# Patient Record
Sex: MALE | URBAN - METROPOLITAN AREA
[De-identification: names, ages, dates, MRNs, and addresses within clinical notes are randomized per-mention and may not be internally consistent; named-entity substitution may affect disease eponyms.]

---

## 2018-04-16 ENCOUNTER — HOSPITAL ENCOUNTER (OUTPATIENT)
Age: 23
Setting detail: SPECIMEN
Discharge: HOME | End: 2018-04-16
Payer: COMMERCIAL

## 2018-04-19 LAB — SURGICAL PATHOLOGY REPORT: NORMAL

## 2023-12-01 ENCOUNTER — OFFICE VISIT (OUTPATIENT)
Age: 28
End: 2023-12-01

## 2023-12-01 ENCOUNTER — HOSPITAL ENCOUNTER (OUTPATIENT)
Age: 28
Setting detail: SPECIMEN
Discharge: HOME | End: 2023-12-01

## 2023-12-01 VITALS
HEART RATE: 83 BPM | DIASTOLIC BLOOD PRESSURE: 98 MMHG | OXYGEN SATURATION: 99 % | HEIGHT: 71 IN | TEMPERATURE: 98.4 F | WEIGHT: 190 LBS | BODY MASS INDEX: 26.6 KG/M2 | SYSTOLIC BLOOD PRESSURE: 130 MMHG

## 2023-12-01 DIAGNOSIS — R30.0 DYSURIA: Primary | ICD-10-CM

## 2023-12-01 DIAGNOSIS — R30.0 DYSURIA: ICD-10-CM

## 2023-12-01 LAB
BILIRUBIN, POC: NEGATIVE
BLOOD URINE, POC: NEGATIVE
CLARITY, POC: CLEAR
COLOR, POC: NORMAL
GLUCOSE URINE, POC: NEGATIVE
KETONES, POC: NEGATIVE
LEUKOCYTE EST, POC: NEGATIVE
NITRITE, POC: NORMAL
PH, POC: 7
PROTEIN, POC: NEGATIVE
SPECIFIC GRAVITY, POC: 1.01
UROBILINOGEN, POC: 0.2

## 2023-12-01 RX ORDER — CIPROFLOXACIN 500 MG/1
500 TABLET, FILM COATED ORAL 2 TIMES DAILY
Qty: 28 TABLET | Refills: 0 | Status: SHIPPED | OUTPATIENT
Start: 2023-12-01 | End: 2023-12-15

## 2023-12-01 SDOH — ECONOMIC STABILITY: FOOD INSECURITY: WITHIN THE PAST 12 MONTHS, THE FOOD YOU BOUGHT JUST DIDN'T LAST AND YOU DIDN'T HAVE MONEY TO GET MORE.: NEVER TRUE

## 2023-12-01 SDOH — ECONOMIC STABILITY: FOOD INSECURITY: WITHIN THE PAST 12 MONTHS, YOU WORRIED THAT YOUR FOOD WOULD RUN OUT BEFORE YOU GOT MONEY TO BUY MORE.: NEVER TRUE

## 2023-12-01 SDOH — ECONOMIC STABILITY: INCOME INSECURITY: HOW HARD IS IT FOR YOU TO PAY FOR THE VERY BASICS LIKE FOOD, HOUSING, MEDICAL CARE, AND HEATING?: NOT HARD AT ALL

## 2023-12-01 SDOH — ECONOMIC STABILITY: HOUSING INSECURITY
IN THE LAST 12 MONTHS, WAS THERE A TIME WHEN YOU DID NOT HAVE A STEADY PLACE TO SLEEP OR SLEPT IN A SHELTER (INCLUDING NOW)?: NO

## 2023-12-01 ASSESSMENT — ENCOUNTER SYMPTOMS
CHEST TIGHTNESS: 0
SHORTNESS OF BREATH: 0

## 2023-12-01 ASSESSMENT — PATIENT HEALTH QUESTIONNAIRE - PHQ9
1. LITTLE INTEREST OR PLEASURE IN DOING THINGS: 0
SUM OF ALL RESPONSES TO PHQ QUESTIONS 1-9: 0
SUM OF ALL RESPONSES TO PHQ9 QUESTIONS 1 & 2: 0
2. FEELING DOWN, DEPRESSED OR HOPELESS: 0
SUM OF ALL RESPONSES TO PHQ QUESTIONS 1-9: 0

## 2023-12-01 NOTE — PROGRESS NOTES
[facility]      Date of Visit:  2023  Patient Name: Marisol Bill   Patient :  1995     CHIEF COMPLAINT/HPI:     Marisol Bill is a 32 y.o. male who presents today for an general visit to be evaluated for the following condition(s):  Chief Complaint   Patient presents with    Urinary Tract Infection     He is here for pain and burning with urination. He also has blood in urine after intercourse. REVIEW OF SYSTEM      Review of Systems   Respiratory:  Negative for chest tightness and shortness of breath. Cardiovascular:  Negative for chest pain. REVIEWED INFORMATION      No Known Allergies    Current Outpatient Medications   Medication Sig Dispense Refill    ciprofloxacin (CIPRO) 500 MG tablet Take 1 tablet by mouth 2 times daily for 14 days 28 tablet 0     No current facility-administered medications for this visit. There is no problem list on file for this patient. No past medical history on file. No past surgical history on file.      Social History     Socioeconomic History    Marital status: Unknown     Spouse name: None    Number of children: None    Years of education: None    Highest education level: None   Tobacco Use    Smoking status: Never    Smokeless tobacco: Never   Substance and Sexual Activity    Alcohol use: Yes     Comment: socially     Social Determinants of Health     Financial Resource Strain: Low Risk  (2023)    Overall Financial Resource Strain (CARDIA)     Difficulty of Paying Living Expenses: Not hard at all   Food Insecurity: No Food Insecurity (2023)    Hunger Vital Sign     Worried About Running Out of Food in the Last Year: Never true     Ran Out of Food in the Last Year: Never true   Transportation Needs: Unknown (2023)    PRAPARE - Transportation     Lack of Transportation (Non-Medical): No   Housing Stability: Unknown (2023)    Housing Stability Vital Sign     Unstable Housing in the Last Year: No        No

## 2023-12-02 LAB
MICROORGANISM SPEC CULT: NORMAL
SPECIMEN DESCRIPTION: NORMAL

## 2023-12-05 ENCOUNTER — TELEPHONE (OUTPATIENT)
Age: 28
End: 2023-12-05

## 2023-12-05 NOTE — TELEPHONE ENCOUNTER
It is not connected to what he was her for before. OK APPT anytime later this week any available doc.

## 2023-12-05 NOTE — TELEPHONE ENCOUNTER
Pt was here Friday since Friday he is experiencing left lower rib pain   please advise if you think this is connected to what he was here for before and do you think he should come in for another appt please  advise  notify pt

## 2023-12-07 ENCOUNTER — OFFICE VISIT (OUTPATIENT)
Age: 28
End: 2023-12-07
Payer: COMMERCIAL

## 2023-12-07 ENCOUNTER — HOSPITAL ENCOUNTER (OUTPATIENT)
Age: 28
Setting detail: SPECIMEN
Discharge: HOME | End: 2023-12-07

## 2023-12-07 VITALS
TEMPERATURE: 99.1 F | WEIGHT: 189 LBS | DIASTOLIC BLOOD PRESSURE: 82 MMHG | RESPIRATION RATE: 12 BRPM | HEART RATE: 77 BPM | OXYGEN SATURATION: 99 % | BODY MASS INDEX: 26.46 KG/M2 | SYSTOLIC BLOOD PRESSURE: 112 MMHG | HEIGHT: 71 IN

## 2023-12-07 DIAGNOSIS — R07.81 RIB PAIN ON LEFT SIDE: ICD-10-CM

## 2023-12-07 DIAGNOSIS — R36.1 HEMATOSPERMIA: ICD-10-CM

## 2023-12-07 DIAGNOSIS — Z13.220 SCREENING, LIPID: ICD-10-CM

## 2023-12-07 DIAGNOSIS — R10.12 LUQ ABDOMINAL PAIN: ICD-10-CM

## 2023-12-07 DIAGNOSIS — R10.12 LUQ ABDOMINAL PAIN: Primary | ICD-10-CM

## 2023-12-07 LAB
BASOPHILS # BLD: 0.06 K/UL (ref 0–0.2)
BASOPHILS NFR BLD: 1 % (ref 0–2)
EOSINOPHIL # BLD: 0.11 K/UL (ref 0–0.44)
EOSINOPHILS RELATIVE PERCENT: 2 % (ref 1–4)
ERYTHROCYTE [DISTWIDTH] IN BLOOD BY AUTOMATED COUNT: 12 % (ref 11.8–14.4)
HCT VFR BLD AUTO: 50.8 % (ref 40.7–50.3)
HGB BLD-MCNC: 17.9 G/DL (ref 13–17)
IMM GRANULOCYTES # BLD AUTO: <0.03 K/UL (ref 0–0.3)
IMM GRANULOCYTES NFR BLD: 0 %
LYMPHOCYTES NFR BLD: 1.84 K/UL (ref 1.1–3.7)
LYMPHOCYTES RELATIVE PERCENT: 28 % (ref 24–43)
MCH RBC QN AUTO: 30.9 PG (ref 25.2–33.5)
MCHC RBC AUTO-ENTMCNC: 35.2 G/DL (ref 28.4–34.8)
MCV RBC AUTO: 87.6 FL (ref 82.6–102.9)
MONOCYTES NFR BLD: 0.73 K/UL (ref 0.1–1.2)
MONOCYTES NFR BLD: 11 % (ref 3–12)
NEUTROPHILS NFR BLD: 58 % (ref 36–65)
NEUTS SEG NFR BLD: 3.8 K/UL (ref 1.5–8.1)
NRBC BLD-RTO: 0 PER 100 WBC
PLATELET # BLD AUTO: 221 K/UL (ref 138–453)
PMV BLD AUTO: 10.3 FL (ref 8.1–13.5)
RBC # BLD AUTO: 5.8 M/UL (ref 4.21–5.77)
WBC OTHER # BLD: 6.6 K/UL (ref 3.5–11.3)

## 2023-12-07 PROCEDURE — 99214 OFFICE O/P EST MOD 30 MIN: CPT | Performed by: STUDENT IN AN ORGANIZED HEALTH CARE EDUCATION/TRAINING PROGRAM

## 2023-12-07 ASSESSMENT — ENCOUNTER SYMPTOMS
RHINORRHEA: 0
ABDOMINAL PAIN: 1
NAUSEA: 0
SHORTNESS OF BREATH: 0
SORE THROAT: 0
CONSTIPATION: 0
DIARRHEA: 0
CHEST TIGHTNESS: 0
VOMITING: 0

## 2023-12-07 NOTE — PROGRESS NOTES
Date of Visit:  2023  Patient Name: Jam Pisano   Patient :  1995     CHIEF COMPLAINT/HPI:     Jam Pisano is a 32 y.o. male who presents today for an general visit to be evaluated for the following condition(s):  Chief Complaint   Patient presents with    Rib Pain      Left side x 4 days. Taking meds for UTI       On 2023 he had blood in the semen and a slight burn when he urinated. He flet that he could not urinate immediately after intercourse but he was able to do so within 10 minutes. He was feeling urinary urgency too and incomplete bladder emptiing. He was then started on Cipro. His urine was sent for culture and actually came back negative. He said about 4 days ago he started with some left upper quadrant left lower rib pains. He was playing hockey on  and the next day started with the pains. He says it is worse with laying on the left side worse with twisting motions rest makes it better. He is not having any signs of ACS this is more of a lower rib and upper abdominal issue. 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 He is hemodynamically stable. He is not having any signs of distress or severe abdominal pain. REVIEW OF SYSTEM      Review of Systems   Constitutional:  Negative for chills and fever. HENT:  Negative for hearing loss, postnasal drip, rhinorrhea and sore throat. Eyes:  Negative for visual disturbance. Respiratory:  Negative for chest tightness and shortness of breath. Cardiovascular:  Negative for chest pain and palpitations. Gastrointestinal:  Positive for abdominal pain. Negative for constipation, diarrhea, nausea and vomiting. Also some rib pain. See HPI   Genitourinary:  Negative for dysuria. Hematospermia. Otherwise no other  complaints. Dysuria and frequency resolved. Musculoskeletal:  Negative for arthralgias.    Skin:  Negative for

## 2023-12-07 NOTE — PATIENT INSTRUCTIONS
Patient Education        Learning About the Mediterranean Diet  What is the [address]? The Mediterranean diet is a style of eating rather than a diet plan. It features foods eaten in [place], [place], [place], and other countries along the [facility]. It emphasizes eating foods like fish, fruits, vegetables, beans, high-fiber breads and whole grains, nuts, and olive oil. This style of eating includes limited red meat, cheese, and sweets. Why choose the Mediterranean diet? A Mediterranean-style diet may improve heart health. It contains more fat than other heart-healthy diets. But the fats are mainly from nuts, unsaturated oils (such as fish oils and olive oil), and certain nut or seed oils (such as canola, soybean, or flaxseed oil). These fats may help protect the heart and blood vessels. How can you get started on the Mediterranean diet? Here are some things you can do to switch to a more Mediterranean way of eating. What to eat  Eat a variety of fruits and vegetables each day, such as grapes, blueberries, tomatoes, broccoli, peppers, figs, olives, spinach, eggplant, beans, lentils, and chickpeas. Eat a variety of whole-grain foods each day, such as oats, brown rice, and whole wheat bread, pasta, and couscous. Eat fish at least 2 times a week. Try tuna, salmon, mackerel, lake trout, herring, or sardines. Eat moderate amounts of low-fat dairy products, such as milk, cheese, or yogurt. Eat moderate amounts of poultry and eggs. Choose healthy (unsaturated) fats, such as nuts, olive oil, and certain nut or seed oils like canola, soybean, and flaxseed. Limit unhealthy (saturated) fats, such as butter, palm oil, and coconut oil. And limit fats found in animal products, such as meat and dairy products made with whole milk. Try to eat red meat only a few times a month in very small amounts. Limit sweets and desserts to only a few times a week.  This includes sugar-sweetened

## 2023-12-08 LAB
ALBUMIN SERPL-MCNC: 5.3 G/DL (ref 3.5–5.2)
ALBUMIN/GLOB SERPL: 2.5 {RATIO} (ref 1–2.5)
ALP SERPL-CCNC: 114 U/L (ref 40–129)
ALT SERPL-CCNC: 21 U/L (ref 5–41)
ANION GAP SERPL CALCULATED.3IONS-SCNC: 15 MMOL/L (ref 9–17)
AST SERPL-CCNC: 25 U/L
BILIRUB SERPL-MCNC: 0.8 MG/DL (ref 0.3–1.2)
BUN SERPL-MCNC: 17 MG/DL (ref 6–20)
CALCIUM SERPL-MCNC: 9.7 MG/DL (ref 8.6–10.4)
CHLORIDE SERPL-SCNC: 97 MMOL/L (ref 98–107)
CHOLEST SERPL-MCNC: 224 MG/DL
CHOLESTEROL/HDL RATIO: 2.7
CO2 SERPL-SCNC: 26 MMOL/L (ref 20–31)
CREAT SERPL-MCNC: 1.2 MG/DL (ref 0.7–1.2)
GFR SERPL CREATININE-BSD FRML MDRD: >60 ML/MIN/1.73M2
GLUCOSE SERPL-MCNC: 75 MG/DL (ref 70–99)
HDLC SERPL-MCNC: 83 MG/DL
LDLC SERPL CALC-MCNC: 125 MG/DL (ref 0–130)
POTASSIUM SERPL-SCNC: 4.5 MMOL/L (ref 3.7–5.3)
PROT SERPL-MCNC: 7.4 G/DL (ref 6.4–8.3)
SODIUM SERPL-SCNC: 138 MMOL/L (ref 135–144)
TRIGL SERPL-MCNC: 82 MG/DL
TSH SERPL DL<=0.05 MIU/L-ACNC: 1.76 UIU/ML (ref 0.3–5)

## 2023-12-12 ENCOUNTER — HOSPITAL ENCOUNTER (OUTPATIENT)
Dept: CT IMAGING | Age: 28
Discharge: HOME | End: 2023-12-14
Attending: STUDENT IN AN ORGANIZED HEALTH CARE EDUCATION/TRAINING PROGRAM
Payer: COMMERCIAL

## 2023-12-12 DIAGNOSIS — R36.1 HEMATOSPERMIA: ICD-10-CM

## 2023-12-12 DIAGNOSIS — R07.81 RIB PAIN ON LEFT SIDE: ICD-10-CM

## 2023-12-12 DIAGNOSIS — R10.12 LUQ ABDOMINAL PAIN: ICD-10-CM

## 2023-12-12 PROCEDURE — 2500000003 HC RX 250 WO HCPCS: Performed by: STUDENT IN AN ORGANIZED HEALTH CARE EDUCATION/TRAINING PROGRAM

## 2023-12-12 PROCEDURE — 6360000004 HC RX CONTRAST MEDICATION: Performed by: STUDENT IN AN ORGANIZED HEALTH CARE EDUCATION/TRAINING PROGRAM

## 2023-12-12 PROCEDURE — 2580000003 HC RX 258: Performed by: STUDENT IN AN ORGANIZED HEALTH CARE EDUCATION/TRAINING PROGRAM

## 2023-12-12 PROCEDURE — 74177 CT ABD & PELVIS W/CONTRAST: CPT

## 2023-12-12 RX ORDER — 0.9 % SODIUM CHLORIDE 0.9 %
80 INTRAVENOUS SOLUTION INTRAVENOUS ONCE
Status: COMPLETED | OUTPATIENT
Start: 2023-12-12 | End: 2023-12-12

## 2023-12-12 RX ORDER — SODIUM CHLORIDE 0.9 % (FLUSH) 0.9 %
10 SYRINGE (ML) INJECTION PRN
Status: DISCONTINUED | OUTPATIENT
Start: 2023-12-12 | End: 2023-12-15 | Stop reason: HOSPADM

## 2023-12-12 RX ADMIN — SODIUM CHLORIDE 80 ML: 9 INJECTION, SOLUTION INTRAVENOUS at 16:00

## 2023-12-12 RX ADMIN — BARIUM SULFATE 450 ML: 20 SUSPENSION ORAL at 16:00

## 2023-12-12 RX ADMIN — SODIUM CHLORIDE, PRESERVATIVE FREE 10 ML: 5 INJECTION INTRAVENOUS at 16:00

## 2023-12-12 RX ADMIN — IOPAMIDOL 75 ML: 755 INJECTION, SOLUTION INTRAVENOUS at 16:00

## 2023-12-27 ENCOUNTER — PATIENT MESSAGE (OUTPATIENT)
Age: 28
End: 2023-12-27

## 2023-12-28 RX ORDER — CEPHALEXIN 500 MG/1
500 CAPSULE ORAL 3 TIMES DAILY
Qty: 30 CAPSULE | Refills: 1 | Status: SHIPPED | OUTPATIENT
Start: 2023-12-28 | End: 2024-01-07

## 2023-12-28 NOTE — TELEPHONE ENCOUNTER
Dr Ana Manuel called in regarding this portal note from the patient he rec'd. Dr Ana Manuel said patient has had these symptoms recently, tested neg for UA. Dr Ana Manuel said he is not comfortable prescribing an atb without having patient provide a urine sample and/or appt. He asked if Dr Carol Spring or another provider in office please review this note for follow up.

## 2024-01-15 ENCOUNTER — OFFICE VISIT (OUTPATIENT)
Dept: GASTROENTEROLOGY | Age: 29
End: 2024-01-15
Payer: COMMERCIAL

## 2024-01-15 ENCOUNTER — OFFICE VISIT (OUTPATIENT)
Age: 29
End: 2024-01-15
Payer: COMMERCIAL

## 2024-01-15 VITALS
BODY MASS INDEX: 26.88 KG/M2 | SYSTOLIC BLOOD PRESSURE: 147 MMHG | DIASTOLIC BLOOD PRESSURE: 96 MMHG | HEART RATE: 86 BPM | HEIGHT: 71 IN | WEIGHT: 192 LBS

## 2024-01-15 VITALS
BODY MASS INDEX: 26.99 KG/M2 | SYSTOLIC BLOOD PRESSURE: 127 MMHG | TEMPERATURE: 97.4 F | DIASTOLIC BLOOD PRESSURE: 73 MMHG | WEIGHT: 192.8 LBS | HEART RATE: 80 BPM | HEIGHT: 71 IN

## 2024-01-15 DIAGNOSIS — A63.0 PENILE WART: ICD-10-CM

## 2024-01-15 DIAGNOSIS — N41.1 CHRONIC PROSTATITIS: ICD-10-CM

## 2024-01-15 DIAGNOSIS — R36.1 HEMATOSPERMIA: Primary | ICD-10-CM

## 2024-01-15 DIAGNOSIS — R10.30 LOWER ABDOMINAL PAIN: Primary | ICD-10-CM

## 2024-01-15 LAB
BILIRUBIN, POC: NORMAL
BLOOD URINE, POC: NORMAL
CLARITY, POC: CLEAR
COLOR, POC: YELLOW
GLUCOSE URINE, POC: NORMAL
KETONES, POC: NORMAL
LEUKOCYTE EST, POC: NORMAL
NITRITE, POC: NORMAL
PH, POC: NORMAL
PROTEIN, POC: NORMAL
SPECIFIC GRAVITY, POC: NORMAL
UROBILINOGEN, POC: NORMAL

## 2024-01-15 PROCEDURE — 81003 URINALYSIS AUTO W/O SCOPE: CPT | Performed by: SPECIALIST

## 2024-01-15 PROCEDURE — 99204 OFFICE O/P NEW MOD 45 MIN: CPT | Performed by: INTERNAL MEDICINE

## 2024-01-15 PROCEDURE — 99204 OFFICE O/P NEW MOD 45 MIN: CPT | Performed by: SPECIALIST

## 2024-01-15 RX ORDER — SULFAMETHOXAZOLE AND TRIMETHOPRIM 800; 160 MG/1; MG/1
1 TABLET ORAL 2 TIMES DAILY
Qty: 60 TABLET | Refills: 0 | Status: SHIPPED | OUTPATIENT
Start: 2024-01-15

## 2024-01-15 NOTE — PROGRESS NOTES
Alvarez Gong MD [facility] Urology Consultation / New Patient Visit    Patient:  John Freeman  YOB: 1995  Date: 1/15/2024    HISTORY OF PRESENT ILLNESS:   The patient is a 28 y.o. male who presents today for evaluation of the following problems:   Symptom: perineal pain and hematospermia  Symptom onset has been acute for a time period of 1 month(s).   Severity is described as mild-moderate.   The course of symptoms over time is chronic.  Alleviating factors: none  Worsening factors: none  Patient's hematospermia has resolved but he is still having perineal pain  Lower urinary tract symptoms: frequency, hesitancy, decreased urinary stream, and incomplete emptying.   Today's AUA Symptom Score (QOL): 4 (1)  (Patient's old records have been requested, reviewed and summarized in today's note: 1/124 ED note of Nirav Bellamy DO)    Summary of old records:   Hematospermia and suspected chronic prostatitis: 12/12/23 CT, 1/1/24 scrotal US negative; Rx Bactrim x 1 month, avoid bladder irritants  Penile wart, dorsal aspect of penis: needs excision to r/o condyloma    Procedures Today: N/A    Urinalysis today:  Results for POC orders placed in visit on 01/15/24   POCT Urinalysis No Micro (Auto)   Result Value Ref Range    Color, UA yellow     Clarity, UA clear     Glucose, UA POC neg     Bilirubin, UA      Ketones, UA      Spec Grav, UA      Blood, UA POC neg     pH, UA      Protein, UA POC neg     Urobilinogen, UA      Leukocytes, UA neg     Nitrite, UA neg        Last several PSA's:  No results found for: \"PSA\"    Last total testosterone:  No results found for: \"TESTOSTERONE\"    Last BUN and creatinine:  Lab Results   Component Value Date    BUN 17 12/07/2023     Lab Results   Component Value Date    CREATININE 1.2 12/07/2023       Last CBC:  Lab Results   Component Value Date    WBC 6.6 12/07/2023    HGB 17.9 (H) 12/07/2023    HCT 50.8 (H) 12/07/2023    MCV 87.6 12/07/2023    PLT

## 2024-01-15 NOTE — PROGRESS NOTES
\"HEPATITIS\"      DIAGNOSTIC TESTING:     US SCROTUM W LIMITED DUPLEX    Result Date: 1/1/2024  CLINICAL INFORMATION: rule out torsion acute testicular pain, greater on the right. TECHNIQUE: Real-time sonographic evaluation of the scrotum and testes was performed with gray scale and color flow imaging. Real time gray scale, color flow imaging and duplex spectral Doppler waveform analysis evaluation was performed of the major arterial inflow and venous outflow structures of the testicles with arterial and venous spectral waveforms obtained and reviewed in view of the clinical history of rule out torsion acute pain. Duplex spectral Doppler document arterial and venous spectral waveforms documented within the major arterial inflow and venous outflow of both testicles.  Arterial and venous Doppler duplex spectral waveforms were evaluated. COMPARISON: Scrotal ultrasound 3/5/2012 FINDINGS: Right testicle measures 2.8 x 3.5 x 2.2 cm.  Right epididymis measures 0.9 cm.  Normal sonographic appearance of the right testicle with normal color flow and document arterial and venous waveforms. Left testicle measures 4.2 x 2.2 x 3.1 cm.  Left epididymis measures 1.2 cm.  Normal sonographic appearance of the left testicle.  Normal color flow with documented arterial and venous waveforms.  Small epididymal head cyst measuring 0.3 cm.  This requires no follow-up. IMPRESSION: *  No testicular torsion or concerning intratesticular mass/lesion. Approved by Resident Dutch Whitten DO  on 1/1/2024 6:27 PM Salvador THOMAS MD have personally reviewed the image(s) and agree with and/or edited the report Workstation:[id number] Finalized by Salvador Murillo MD on 1/1/2024 6:37 PM         IMPRESSION: Mr. Freeman is a 28 y.o. male with pain lower abdomen with urinary symptoms and pain in testes and penis. Pain is unrelated to food intake or BM's   His symptoms are unlikely from GI tract. He does c/o- altered bowel habits- has sometimes BM's several

## 2024-02-12 ENCOUNTER — HOSPITAL ENCOUNTER (OUTPATIENT)
Age: 29
Setting detail: SPECIMEN
Discharge: HOME | End: 2024-02-12

## 2024-02-12 ENCOUNTER — PROCEDURE VISIT (OUTPATIENT)
Age: 29
End: 2024-02-12
Payer: COMMERCIAL

## 2024-02-12 VITALS — WEIGHT: 192 LBS | HEIGHT: 71 IN | BODY MASS INDEX: 26.88 KG/M2

## 2024-02-12 DIAGNOSIS — N41.1 CHRONIC PROSTATITIS: ICD-10-CM

## 2024-02-12 DIAGNOSIS — A63.0 PENILE WART: ICD-10-CM

## 2024-02-12 DIAGNOSIS — R36.1 HEMATOSPERMIA: Primary | ICD-10-CM

## 2024-02-12 PROCEDURE — 99213 OFFICE O/P EST LOW 20 MIN: CPT | Performed by: SPECIALIST

## 2024-02-12 PROCEDURE — 54060 EXCISION OF PENIS LESION(S): CPT | Performed by: SPECIALIST

## 2024-02-12 NOTE — PROGRESS NOTES
Alvarez Gong MD [facility] Urology Office Progress Note    Patient:  John Freeman  YOB: 1995  Date: 2/12/2024    HISTORY OF PRESENT ILLNESS:   The patient is a 28 y.o. male  Patient's lower urinary tract symptoms are better since taking Bactrim DS 1 po bid for 1 month for Rx of chronic prostatitis. He still has a split stream on occasion but this is not bothersome enough to warrant medical Rx.  Lower urinary tract symptoms: n/a   Last AUA Symptom Score (QOL): 4 (1)    Summary of old records:   Hematospermia and suspected chronic prostatitis: 12/12/23 CT, 1/1/24 scrotal US negative; Rx Bactrim x 1 month, avoid bladder irritants  Penile wart, dorsal aspect of penis: needs excision to r/o condyloma     Additional History: none    Procedures Today:   Patient prepped and draped in the usual sterile fashion.  Using lidocaine without epinephrine, the penile skin was anesthetized.  A scalpel was used excise the lesion.  Eye cautery used to control any bleeding.  One interrupted 3-0 Chromic suture was used to close the incision.  The patient tolerated the procedure well.     Urinalysis today:  No results found for this visit on 02/12/24.    Last several PSA's:  No results found for: \"PSA\"    Last total testosterone:  No results found for: \"TESTOSTERONE\"    Last BUN and creatinine:  Lab Results   Component Value Date    BUN 17 12/07/2023     Lab Results   Component Value Date    CREATININE 1.2 12/07/2023       Last CBC:  Lab Results   Component Value Date    WBC 6.6 12/07/2023    HGB 17.9 (H) 12/07/2023    HCT 50.8 (H) 12/07/2023    MCV 87.6 12/07/2023     12/07/2023       Additional Lab/Culture results: none    Imaging Reviewed during this Office Visit: none  (results were independently reviewed by physician and radiology report verified)    Physical Exam:    There were no vitals filed for this visit.  Body mass index is 26.78 kg/m².  Patient is a 28 y.o. male in no acute

## 2024-02-15 LAB — SURGICAL PATHOLOGY REPORT: NORMAL

## 2024-08-28 ENCOUNTER — OFFICE VISIT (OUTPATIENT)
Age: 29
End: 2024-08-28
Payer: COMMERCIAL

## 2024-08-28 VITALS
HEIGHT: 71 IN | DIASTOLIC BLOOD PRESSURE: 88 MMHG | BODY MASS INDEX: 26.99 KG/M2 | TEMPERATURE: 98.2 F | OXYGEN SATURATION: 97 % | WEIGHT: 192.8 LBS | HEART RATE: 78 BPM | SYSTOLIC BLOOD PRESSURE: 120 MMHG

## 2024-08-28 DIAGNOSIS — S83.241D TEAR OF MEDIAL MENISCUS OF RIGHT KNEE, CURRENT, UNSPECIFIED TEAR TYPE, SUBSEQUENT ENCOUNTER: Primary | ICD-10-CM

## 2024-08-28 PROCEDURE — 99213 OFFICE O/P EST LOW 20 MIN: CPT | Performed by: FAMILY MEDICINE

## 2024-08-28 ASSESSMENT — PATIENT HEALTH QUESTIONNAIRE - PHQ9
SUM OF ALL RESPONSES TO PHQ QUESTIONS 1-9: 0
1. LITTLE INTEREST OR PLEASURE IN DOING THINGS: NOT AT ALL
2. FEELING DOWN, DEPRESSED OR HOPELESS: NOT AT ALL
SUM OF ALL RESPONSES TO PHQ QUESTIONS 1-9: 0
SUM OF ALL RESPONSES TO PHQ9 QUESTIONS 1 & 2: 0

## 2024-08-28 NOTE — PROGRESS NOTES
[facility]     Date of Visit:  9/3/2024  Patient Name: John Freeman   Patient :  1995     CHIEF COMPLAINT/HPI:     John Freeman is a 28 y.o. male who presents today for an general visit to be evaluated for the following condition(s):  Chief Complaint   Patient presents with    Knee Pain     He is here for right knee pain.  He injured it while working out.  It is on the inside of the knee.    Patient was doing lunges and felt a pop.  Has been taking ibuprofen/icing.  Occurred 1 week ago.    REVIEW OF SYSTEM      Review of Systems   Respiratory:  Negative for chest tightness and shortness of breath.    Cardiovascular:  Negative for chest pain.         REVIEWED INFORMATION      No Known Allergies    No current outpatient medications on file.     No current facility-administered medications for this visit.        Patient Active Problem List   Diagnosis    Hematospermia    Penile wart    Chronic prostatitis       No past medical history on file.    Past Surgical History:   Procedure Laterality Date    COLONOSCOPY          Social History     Socioeconomic History    Marital status: Single     Spouse name: None    Number of children: None    Years of education: None    Highest education level: None   Occupational History    Occupation:    Tobacco Use    Smoking status: Never    Smokeless tobacco: Never   Vaping Use    Vaping status: Never Used   Substance and Sexual Activity    Alcohol use: Yes     Comment: socially    Drug use: Never    Sexual activity: Yes     Partners: Female     Social Determinants of Health     Financial Resource Strain: Low Risk  (2023)    Overall Financial Resource Strain (CARDIA)     Difficulty of Paying Living Expenses: Not hard at all   Food Insecurity: No Food Insecurity (2023)    Hunger Vital Sign     Worried About Running Out of Food in the Last Year: Never true     Ran Out of Food in the Last Year: Never true   Transportation Needs: Unknown

## 2024-09-03 ASSESSMENT — ENCOUNTER SYMPTOMS
SHORTNESS OF BREATH: 0
CHEST TIGHTNESS: 0

## 2024-11-22 ENCOUNTER — TELEPHONE (OUTPATIENT)
Age: 29
End: 2024-11-22

## 2024-11-22 DIAGNOSIS — H00.019 HORDEOLUM EXTERNUM, UNSPECIFIED LATERALITY: Primary | ICD-10-CM

## 2024-11-22 RX ORDER — ERYTHROMYCIN 5 MG/G
OINTMENT OPHTHALMIC
Qty: 3.5 G | Refills: 0 | Status: SHIPPED | OUTPATIENT
Start: 2024-11-22 | End: 2024-12-02

## 2024-11-22 NOTE — TELEPHONE ENCOUNTER
Patient not sure if something can be called in for him or should he be seen?    Has a pimple or sty on the left lower eyelid. Been there for about a week.  It does not itch or hurt.  The spot  itself is slightly swollen and area around is puffy and red    Notify patient if something can be called in or if he needs apt    [facility] in [place]

## 2024-12-06 ENCOUNTER — TELEPHONE (OUTPATIENT)
Age: 29
End: 2024-12-06

## 2024-12-06 NOTE — TELEPHONE ENCOUNTER
Patient has had a red bump under his eye for a few weeks was seen by NB and was prescribed some cream he states that it has not helped. Patient has an apt. Monday with [facility] but wanted me to send a message to update both

## 2024-12-09 ENCOUNTER — OFFICE VISIT (OUTPATIENT)
Age: 29
End: 2024-12-09

## 2024-12-09 VITALS
OXYGEN SATURATION: 98 % | WEIGHT: 192.4 LBS | HEART RATE: 101 BPM | BODY MASS INDEX: 26.94 KG/M2 | HEIGHT: 71 IN | SYSTOLIC BLOOD PRESSURE: 128 MMHG | DIASTOLIC BLOOD PRESSURE: 80 MMHG

## 2024-12-09 DIAGNOSIS — Z00.00 WELL ADULT EXAM: ICD-10-CM

## 2024-12-09 DIAGNOSIS — H00.015 HORDEOLUM EXTERNUM OF LEFT LOWER EYELID: Primary | ICD-10-CM

## 2024-12-09 RX ORDER — ERYTHROMYCIN 5 MG/G
OINTMENT OPHTHALMIC NIGHTLY
COMMUNITY

## 2024-12-09 SDOH — ECONOMIC STABILITY: FOOD INSECURITY: WITHIN THE PAST 12 MONTHS, THE FOOD YOU BOUGHT JUST DIDN'T LAST AND YOU DIDN'T HAVE MONEY TO GET MORE.: NEVER TRUE

## 2024-12-09 SDOH — ECONOMIC STABILITY: FOOD INSECURITY: WITHIN THE PAST 12 MONTHS, YOU WORRIED THAT YOUR FOOD WOULD RUN OUT BEFORE YOU GOT MONEY TO BUY MORE.: NEVER TRUE

## 2024-12-09 SDOH — ECONOMIC STABILITY: INCOME INSECURITY: HOW HARD IS IT FOR YOU TO PAY FOR THE VERY BASICS LIKE FOOD, HOUSING, MEDICAL CARE, AND HEATING?: NOT HARD AT ALL

## 2024-12-09 NOTE — PROGRESS NOTES
Date of Visit:  2024  Patient Name: John Freeman   Patient :  1995     CHIEF COMPLAINT/HPI:     John Freeman is a 28 y.o. male who presents today for an general visit to be evaluated for the following condition(s):  Chief Complaint   Patient presents with    Other     Follow up spot under left eye ongoing for 3 weeks not improving        Patient has a stye on the left eye this been ongoing for 3 weeks he has been on erythromycin ointment with really not much improvement.  He is tried warm compresses as well no vision changes no other concerning issues.        REVIEW OF SYSTEM      Review of Systems   Constitutional:  Negative for chills and fever.   HENT:  Negative for hearing loss, postnasal drip, rhinorrhea and sore throat.    Eyes:  Negative for visual disturbance.        Left eye stye.   Respiratory:  Negative for chest tightness and shortness of breath.    Cardiovascular:  Negative for chest pain and palpitations.   Gastrointestinal:  Negative for constipation, diarrhea, nausea and vomiting.   Genitourinary:  Negative for dysuria.   Musculoskeletal:  Negative for arthralgias.   Skin:  Negative for rash.   Neurological:  Negative for dizziness, weakness, light-headedness, numbness and headaches.         REVIEWED INFORMATION      No Known Allergies    Current Outpatient Medications   Medication Sig Dispense Refill    erythromycin (ROMYCIN) 5 MG/GM ophthalmic ointment Place into the left eye nightly       No current facility-administered medications for this visit.        Patient Active Problem List   Diagnosis    Hematospermia    Penile wart    Chronic prostatitis       No past medical history on file.    Past Surgical History:   Procedure Laterality Date    COLONOSCOPY          Social History     Socioeconomic History    Marital status: Single     Spouse name: None    Number of children: None    Years of education: None    Highest education level: None   Occupational History    Occupation:

## 2024-12-12 ASSESSMENT — ENCOUNTER SYMPTOMS
DIARRHEA: 0
SORE THROAT: 0
CHEST TIGHTNESS: 0
CONSTIPATION: 0
NAUSEA: 0
RHINORRHEA: 0
VOMITING: 0
SHORTNESS OF BREATH: 0

## 2024-12-30 ENCOUNTER — HOSPITAL ENCOUNTER (OUTPATIENT)
Age: 29
Setting detail: SPECIMEN
Discharge: HOME | End: 2024-12-30

## 2024-12-30 DIAGNOSIS — Z00.00 WELL ADULT EXAM: ICD-10-CM

## 2024-12-30 LAB
ALBUMIN SERPL-MCNC: 5.1 G/DL (ref 3.5–5.2)
ALBUMIN/GLOB SERPL: 2.3 {RATIO} (ref 1–2.5)
ALP SERPL-CCNC: 116 U/L (ref 40–129)
ALT SERPL-CCNC: 42 U/L (ref 10–50)
ANION GAP SERPL CALCULATED.3IONS-SCNC: 10 MMOL/L (ref 9–16)
AST SERPL-CCNC: 33 U/L (ref 10–50)
BASOPHILS # BLD: 0.08 K/UL (ref 0–0.2)
BASOPHILS NFR BLD: 1 % (ref 0–2)
BILIRUB SERPL-MCNC: 0.4 MG/DL (ref 0–1.2)
BUN SERPL-MCNC: 16 MG/DL (ref 6–20)
CALCIUM SERPL-MCNC: 10 MG/DL (ref 8.6–10.4)
CHLORIDE SERPL-SCNC: 100 MMOL/L (ref 98–107)
CHOLEST SERPL-MCNC: 245 MG/DL (ref 0–199)
CHOLESTEROL/HDL RATIO: 2.8
CO2 SERPL-SCNC: 30 MMOL/L (ref 20–31)
CREAT SERPL-MCNC: 1.4 MG/DL (ref 0.7–1.2)
EOSINOPHIL # BLD: 0.28 K/UL (ref 0–0.44)
EOSINOPHILS RELATIVE PERCENT: 4 % (ref 1–4)
ERYTHROCYTE [DISTWIDTH] IN BLOOD BY AUTOMATED COUNT: 11.7 % (ref 11.8–14.4)
GFR, ESTIMATED: 69 ML/MIN/1.73M2
GLUCOSE SERPL-MCNC: 74 MG/DL (ref 74–99)
HCT VFR BLD AUTO: 48.3 % (ref 40.7–50.3)
HDLC SERPL-MCNC: 88 MG/DL
HGB BLD-MCNC: 16.7 G/DL (ref 13–17)
IMM GRANULOCYTES # BLD AUTO: 0.03 K/UL (ref 0–0.3)
IMM GRANULOCYTES NFR BLD: 0 %
LDLC SERPL CALC-MCNC: 126 MG/DL (ref 0–100)
LYMPHOCYTES NFR BLD: 2.12 K/UL (ref 1.1–3.7)
LYMPHOCYTES RELATIVE PERCENT: 27 % (ref 24–43)
MCH RBC QN AUTO: 30 PG (ref 25.2–33.5)
MCHC RBC AUTO-ENTMCNC: 34.6 G/DL (ref 28.4–34.8)
MCV RBC AUTO: 86.9 FL (ref 82.6–102.9)
MONOCYTES NFR BLD: 0.87 K/UL (ref 0.1–1.2)
MONOCYTES NFR BLD: 11 % (ref 3–12)
NEUTROPHILS NFR BLD: 57 % (ref 36–65)
NEUTS SEG NFR BLD: 4.38 K/UL (ref 1.5–8.1)
NRBC BLD-RTO: 0 PER 100 WBC
PLATELET # BLD AUTO: 213 K/UL (ref 138–453)
PMV BLD AUTO: 10.1 FL (ref 8.1–13.5)
POTASSIUM SERPL-SCNC: 4.3 MMOL/L (ref 3.7–5.3)
PROT SERPL-MCNC: 7.3 G/DL (ref 6.6–8.7)
RBC # BLD AUTO: 5.56 M/UL (ref 4.21–5.77)
SODIUM SERPL-SCNC: 140 MMOL/L (ref 136–145)
TRIGL SERPL-MCNC: 155 MG/DL
TSH SERPL DL<=0.05 MIU/L-ACNC: 2.42 UIU/ML (ref 0.27–4.2)
VLDLC SERPL CALC-MCNC: 31 MG/DL (ref 1–30)
WBC OTHER # BLD: 7.8 K/UL (ref 3.5–11.3)

## 2025-01-09 ENCOUNTER — HOSPITAL ENCOUNTER (OUTPATIENT)
Age: 30
Setting detail: SPECIMEN
Discharge: HOME | End: 2025-01-09

## 2025-01-09 LAB
ALBUMIN SERPL-MCNC: 5.2 G/DL (ref 3.5–5.2)
ALBUMIN/GLOB SERPL: 2.4 {RATIO} (ref 1–2.5)
ALP SERPL-CCNC: 110 U/L (ref 40–129)
ALT SERPL-CCNC: 35 U/L (ref 10–50)
ANION GAP SERPL CALCULATED.3IONS-SCNC: 11 MMOL/L (ref 9–16)
AST SERPL-CCNC: 28 U/L (ref 10–50)
BILIRUB SERPL-MCNC: 0.5 MG/DL (ref 0–1.2)
BUN SERPL-MCNC: 22 MG/DL (ref 6–20)
CALCIUM SERPL-MCNC: 10 MG/DL (ref 8.6–10.4)
CHLORIDE SERPL-SCNC: 100 MMOL/L (ref 98–107)
CO2 SERPL-SCNC: 29 MMOL/L (ref 20–31)
CREAT SERPL-MCNC: 1.1 MG/DL (ref 0.7–1.2)
GFR, ESTIMATED: >90 ML/MIN/1.73M2
GLUCOSE SERPL-MCNC: 84 MG/DL (ref 74–99)
POTASSIUM SERPL-SCNC: 4.7 MMOL/L (ref 3.7–5.3)
PROT SERPL-MCNC: 7.4 G/DL (ref 6.6–8.7)
SODIUM SERPL-SCNC: 140 MMOL/L (ref 136–145)

## 2025-01-10 ENCOUNTER — HOSPITAL ENCOUNTER (OUTPATIENT)
Dept: PHYSICAL THERAPY | Facility: CLINIC | Age: 30
Setting detail: THERAPIES SERIES
Discharge: HOME | End: 2025-01-10
Payer: COMMERCIAL

## 2025-01-10 PROCEDURE — 97161 PT EVAL LOW COMPLEX 20 MIN: CPT

## 2025-01-10 PROCEDURE — 97110 THERAPEUTIC EXERCISES: CPT

## 2025-01-10 NOTE — CONSULTS
mobilization        Specific Instructions for next treatment: strengthening within available range, manual to improve GH motion and MFR restrictions     Evaluation Complexity:  History (Personal factors, comorbidities) [] 0 [x] 1-2 [] 3+   Exam (limitations, restrictions) [] 1-2 [] 3 [x] 4+   Clinical presentation (progression) [x] Stable [] Evolving  [] Unstable   Decision Making [x] Low [] Moderate [] High    [x] Low Complexity [] Moderate Complexity [] High Complexity       Treatment Charges: Mins Units   [x] Evaluation       [x]  Low       []  Moderate       []  High 22 1   []  Modalities     [x]  Ther Exercise 5 --   [x]  Manual Therapy 10 1   []  Ther Activities     []  Aquatics     []  Vasocompression     []  Other       TOTAL TREATMENT TIME: 37 min     Time in: 0955    Time Out: 1032      Electronically signed by: Bessie Wynn PT        Physician Signature:________________________________Date:__________________  By signing above or cosigning this note, I have reviewed this plan of care and certify a need for medically necessary rehabilitation services.     *PLEASE SIGN ABOVE AND FAX BACK ALL PAGES*

## 2025-01-13 ENCOUNTER — HOSPITAL ENCOUNTER (OUTPATIENT)
Dept: PHYSICAL THERAPY | Facility: CLINIC | Age: 30
Discharge: HOME | End: 2025-01-13

## 2025-01-13 PROCEDURE — 9900000067 HC THERAPEUTIC EXERCISE EA 15 MINS (SELF-PAY)

## 2025-01-13 PROCEDURE — 9900000073 HC MANUAL THERAPY PER 15 MIN (SELF-PAY)

## 2025-01-13 NOTE — FLOWSHEET NOTE
Action:  Comments: Patient has been working on his stretches 2-3x/day.     Objective:  Exercises:  Precautions: 12+ weeks pec major repair    Exercise    Reps/ Time Weight/ Level Comments             Bike UE  8'                 Doorway Pectoral Stretch  3x30\"    at 90 and 120 degrees   Wall slides  10x10 ea\"        Scapular Retractions  HEP      Upper Trap Stretch          Levator Scap Stretch                    Prone          Scap Retractions   10x10\"       Scap Depressions   10x10\"                 Bench       Rows  x20 5#    Extension  x20 5#    Horizontal abduction  x15 3#    Scaption  x15 0#          Tband          Rows   x20 Grey      Extension   x20 Grey      ER bilat   x20 Carrera      Horizontal abduction   x20 Carrera                   Other:   Myofascial Restrictions  Location  R/L Treatment  Tools Notes   Upper Crossed     [] Upper Trap [] Right  [] Left [] Direct  [] Indirect [] Hands-On  [] IASTM  [] Hypervolt       [x] Pec Minor [] Right  [x] Left [x] Direct  [] Indirect [x] Hands-On  [] IASTM  [] Hypervolt       [x] Pec Major  [] Right  [x] Left [x] Direct  [] Indirect [x] Hands-On  [] IASTM  [] Hypervolt       [x] Other : left subscapularis  [] Right  [x] Left [x] Direct  [] Indirect [x] Hands-On  [] IASTM  [] Hypervolt       [x] GH joint glides        Inferior joint glides to improve abduction ROM followed by agonist/antagonist/agonist contractions x2                      Direct and Indirect Release     IASTM= Instrument assisted soft tissue mobilization        Somatic Dysfunctions Normal Deficit Details Treatment  Position Range of force   Cervical   [] []  [] MET   [] MOB   [] Manipulation [] Supine  [] Prone  [] Seated  [] Beginning  [] Middle  [] End-point      Thoracic   [] [x] FRSL T4-T8 [] MET   [x] MOB   [] Manipulation [] Supine  [x] Prone  [] Seated  [] Beginning  [] Middle  [x] End-point    Rib   [] [x] Left ribs 4-8 [] MET   [x] MOB   [] Manipulation [] Supine  [x] Prone  [] Seated  []

## 2025-01-15 ENCOUNTER — HOSPITAL ENCOUNTER (OUTPATIENT)
Dept: PHYSICAL THERAPY | Facility: CLINIC | Age: 30
Setting detail: THERAPIES SERIES
Discharge: HOME | End: 2025-01-15

## 2025-01-15 PROCEDURE — 9900000073 HC MANUAL THERAPY PER 15 MIN (SELF-PAY)

## 2025-01-15 NOTE — FLOWSHEET NOTE
[] [facility]  Outpatient Rehabilitation &  Therapy  [address]  [phone]  [phone] [] [facility]  Outpatient Rehabilitation &  Therapy  [address]  P: [phone]  F: [phone] [] [facility]  Outpatient Rehabilitation &  Therapy  [address]  P: [phone]  F: [phone] [] [facility]  Outpatient Rehabilitation &  Therapy  [address]  [phone]  [phone] [x] [facility]  Outpatient Rehabilitation &  Therapy  [address]   P: [phone]  F: [phone]  [] [facility]  Outpatient Rehabilitation &  Therapy  [address]  P: [phone]  F: [phone] [] [facility]  Outpatient Rehabilitation &  Therapy  [address]  P: [phone]  F: [phone] [] [facility]  Outpatient Rehabilitation &  Therapy  [address]  P: [phone]  F: [phone] [] [facility]  Outpatient Rehabilitation &  Therapy  [address]  P: [phone]  F: [phone]  [] [facility] Outpatient Rehabilitation &  Therapy  [address]  P: [phone]  F: [phone]     Physical Therapy Daily Treatment Note    Date:  1/15/2025  Patient Name:  John Freeman    :  1995  MRN: [id number]  Physician: Dr. Juan J La               Insurance: Self pay retail   Medical Diagnosis: Z98.890 (ICD-10-CM) - History of repair of pectoralis muscle tear                 Rehab Codes: M62.81 , M25.512 , M25.612 , R29.3   Onset Date: 24                                         Next 's appt: Mid-February   Visit# / total visits: 3/16     Cancels/No Shows: 0    Subjective:    Pain:  [x] Yes  [] No Location: left shoulder  Pain Rating: (0-10 scale) not rated/10  Pain altered Tx:  [x] No  [] Yes

## 2025-01-21 ENCOUNTER — HOSPITAL ENCOUNTER (OUTPATIENT)
Dept: PHYSICAL THERAPY | Facility: CLINIC | Age: 30
Setting detail: THERAPIES SERIES
Discharge: HOME | End: 2025-01-21

## 2025-01-21 PROCEDURE — 9900000073 HC MANUAL THERAPY PER 15 MIN (SELF-PAY)

## 2025-01-21 NOTE — FLOWSHEET NOTE
[] [facility]  Outpatient Rehabilitation &  Therapy  [address]  [phone]  [phone] [] [facility]  Outpatient Rehabilitation &  Therapy  [address]  P: [phone]  F: [phone] [] [facility]  Outpatient Rehabilitation &  Therapy  [address]  P: [phone]  F: [phone] [] [facility]  Outpatient Rehabilitation &  Therapy  [address]  [phone]  [phone] [x] [facility]  Outpatient Rehabilitation &  Therapy  [address]   P: [phone]  F: [phone]  [] [facility]  Outpatient Rehabilitation &  Therapy  [address]  P: [phone]  F: [phone] [] [facility]  Outpatient Rehabilitation &  Therapy  [address]  P: [phone]  F: [phone] [] [facility]  Outpatient Rehabilitation &  Therapy  [address]  P: [phone]  F: [phone] [] [facility]  Outpatient Rehabilitation &  Therapy  [address]  P: [phone]  F: [phone]  [] [facility] Outpatient Rehabilitation &  Therapy  [address]  P: [phone]  F: [phone]     Physical Therapy Daily Treatment Note    Date:  2025  Patient Name:  John Freeman    :  1995  MRN: [id number]  Physician: Dr. Juan J La               Insurance: Self pay retail   Medical Diagnosis: Z98.890 (ICD-10-CM) - History of repair of pectoralis muscle tear                   Rehab Codes: M62.81 , M25.512 , M25.612 , R29.3   Onset Date: 24                                         Next 's appt: Mid-February   Visit# / total visits:      Cancels/No Shows: 0    Subjective:    Pain:  [x] Yes  [] No Location: left shoulder  Pain Rating: (0-10 scale) not rated/10  Pain altered Tx:  [x] No  [] Yes

## 2025-01-23 ENCOUNTER — HOSPITAL ENCOUNTER (OUTPATIENT)
Dept: PHYSICAL THERAPY | Facility: CLINIC | Age: 30
Setting detail: THERAPIES SERIES
Discharge: HOME | End: 2025-01-23

## 2025-01-23 PROCEDURE — 9900000073 HC MANUAL THERAPY PER 15 MIN (SELF-PAY)

## 2025-01-23 NOTE — FLOWSHEET NOTE
[] [facility]  Outpatient Rehabilitation &  Therapy  [address]  [phone]  [phone] [] [facility]  Outpatient Rehabilitation &  Therapy  [address]  P: [phone]  F: [phone] [] [facility]  Outpatient Rehabilitation &  Therapy  [address]  P: [phone]  F: [phone] [] [facility]  Outpatient Rehabilitation &  Therapy  [address]  [phone]  [phone] [x] [facility]  Outpatient Rehabilitation &  Therapy  [address]   P: [phone]  F: [phone]  [] [facility]  Outpatient Rehabilitation &  Therapy  [address]  P: [phone]  F: [phone] [] [facility]  Outpatient Rehabilitation &  Therapy  [address]  P: [phone]  F: [phone] [] [facility]  Outpatient Rehabilitation &  Therapy  [address]  P: [phone]  F: [phone] [] [facility]  Outpatient Rehabilitation &  Therapy  [address]  P: [phone]  F: [phone]  [] [facility] Outpatient Rehabilitation &  Therapy  [address]  P: [phone]  F: [phone]     Physical Therapy Daily Treatment Note    Date:  2025  Patient Name:  John Freemna    :  1995  MRN: [id number]  Physician: Dr. Juan J La               Insurance: Self pay retail   Medical Diagnosis: Z98.890 (ICD-10-CM) - History of repair of pectoralis muscle tear                   Rehab Codes: M62.81 , M25.512 , M25.612 , R29.3   Onset Date: 24                                         Next 's appt: Mid-February     Visit# / total visits:      Cancels/No Shows: 0    Subjective:    Pain:  [x] Yes  [] No Location: left shoulder  Pain Rating: (0-10 scale) not rated/10  Pain altered Tx:  [x] No  [] Yes

## 2025-01-28 ENCOUNTER — HOSPITAL ENCOUNTER (OUTPATIENT)
Dept: PHYSICAL THERAPY | Facility: CLINIC | Age: 30
Setting detail: THERAPIES SERIES
Discharge: HOME | End: 2025-01-28

## 2025-01-28 PROCEDURE — 9900000073 HC MANUAL THERAPY PER 15 MIN (SELF-PAY)

## 2025-01-30 ENCOUNTER — HOSPITAL ENCOUNTER (OUTPATIENT)
Dept: PHYSICAL THERAPY | Facility: CLINIC | Age: 30
Discharge: HOME | End: 2025-01-30

## 2025-01-30 PROCEDURE — 9900000073 HC MANUAL THERAPY PER 15 MIN (SELF-PAY)

## 2025-01-30 NOTE — FLOWSHEET NOTE
[] [facility]  Outpatient Rehabilitation &  Therapy  [address]  [phone]  [phone] [] [facility]  Outpatient Rehabilitation &  Therapy  [address]  P: [phone]  F: [phone] [] [facility]  Outpatient Rehabilitation &  Therapy  [address]  P: [phone]  F: [phone] [] [facility]  Outpatient Rehabilitation &  Therapy  [address]  [phone]  [phone] [x] [facility]  Outpatient Rehabilitation &  Therapy  [address]   P: [phone]  F: [phone]  [] [facility]  Outpatient Rehabilitation &  Therapy  [address]  P: [phone]  F: [phone] [] [facility]  Outpatient Rehabilitation &  Therapy  [address]  P: [phone]  F: [phone] [] [facility]  Outpatient Rehabilitation &  Therapy  [address]  P: [phone]  F: [phone] [] [facility]  Outpatient Rehabilitation &  Therapy  [address]  P: [phone]  F: [phone]  [] [facility] Outpatient Rehabilitation &  Therapy  [address]  P: [phone]  F: [phone]     Physical Therapy Daily Treatment Note    Date:  2025  Patient Name:  John Freeman    :  1995  MRN: [id number]  Physician: Dr. Juan J La               Insurance: Self pay retail   Medical Diagnosis: Z98.890 (ICD-10-CM) - History of repair of pectoralis muscle tear                   Rehab Codes: M62.81 , M25.512 , M25.612 , R29.3   Onset Date: 24                                         Next 's appt: Mid-February   Visit# / total visits:      Cancels/No Shows: 0    Subjective:    Pain:  [x] Yes  [] No Location: left shoulder  Pain Rating: (0-10 scale) not rated/10  Pain altered Tx:  [x] No  [] Yes

## 2025-02-03 ENCOUNTER — HOSPITAL ENCOUNTER (OUTPATIENT)
Dept: PHYSICAL THERAPY | Facility: CLINIC | Age: 30
Setting detail: THERAPIES SERIES
Discharge: HOME | End: 2025-02-03

## 2025-02-03 PROCEDURE — 9900000067 HC THERAPEUTIC EXERCISE EA 15 MINS (SELF-PAY)

## 2025-02-03 NOTE — FLOWSHEET NOTE
Action:  Comments: Patient arrived noting continued gradual improvements in his motion. Has been working on his strength. He did cancel his follow up appointment with his surgeon as he is out of network with their office now.     Objective:  Exercises:  Precautions: 12+ weeks pec major repair    Exercise    Reps/ Time Weight/ Level Comments             Bike UE  5'                 Corner Pectoral Stretch  HEP    at 90 and 120 degrees  HEP   Towel IR stretch      Sleeper stretch at wall  HEP  Using LAX ball as self-release    Wall slides         Scapular Retractions                  Prone- bench          Shoulder extension   2x10 7#     abduction   2x10  3# To stretch into overhead motion    Shoulder rows   2x10 7#    Scaption   2x10 3#          Supine       Manually resisted D2 flexion/extension  x10     Contract relax to improve horizontal abduction  3x10\"  At 90 and 120 degrees    Manually resisted IR/ER at 90 degrees abduction  x10      Rhythmic Stabilization IR/ER 2x30\"           Body Blade  2x20\" ea   At side, elbow bent, and at 90 deg flexion    BOSU [place]  X20 ea   L/R and forward/backward   BOSU Modified Push up plus    x20       Other:   Manual    Myofascial Restrictions  Location  R/L Treatment  Tools Notes   Upper Crossed     [] Upper Trap [] Right  [] Left [] Direct  [] Indirect [] Hands-On  [] IASTM  [] Hypervolt       [x] Pec Minor [] Right  [x] Left [x] Direct  [] Indirect [x] Hands-On  [] IASTM  [] Hypervolt       [x] Pec Major  [] Right  [x] Left [x] Direct  [] Indirect [x] Hands-On  [] IASTM  [] Hypervolt       [x] Other : left subscapularis  [] Right  [x] Left [x] Direct  [] Indirect [x] Hands-On  [] IASTM  [] Hypervolt       [x] GH joint glides- supine        Inferior joint glides to improve abduction/flexion ROM followed by agonist/antagonist/agonist contractions x2     [] GH joint glides - prone                  Direct and Indirect Release     IASTM= Instrument assisted soft tissue mobilization

## 2025-02-04 ENCOUNTER — TELEPHONE (OUTPATIENT)
Age: 30
End: 2025-02-04

## 2025-02-04 NOTE — TELEPHONE ENCOUNTER
Pt wanted to know about his lab results from Jan 9  are they ok he saw them on line but was double checking ? Notify pt

## 2025-02-05 ENCOUNTER — HOSPITAL ENCOUNTER (OUTPATIENT)
Dept: PHYSICAL THERAPY | Facility: CLINIC | Age: 30
Setting detail: THERAPIES SERIES
Discharge: HOME | End: 2025-02-05

## 2025-02-05 PROCEDURE — 97110 THERAPEUTIC EXERCISES: CPT

## 2025-02-05 PROCEDURE — 9900000067 HC THERAPEUTIC EXERCISE EA 15 MINS (SELF-PAY)

## 2025-02-05 PROCEDURE — 9900000073 HC MANUAL THERAPY PER 15 MIN (SELF-PAY)

## 2025-02-05 PROCEDURE — 97140 MANUAL THERAPY 1/> REGIONS: CPT

## 2025-02-05 NOTE — FLOWSHEET NOTE
[] [facility]  Outpatient Rehabilitation &  Therapy  [address]  [phone]  [phone] [] [facility]  Outpatient Rehabilitation &  Therapy  [address]  P: [phone]  F: [phone] [] [facility]  Outpatient Rehabilitation &  Therapy  [address]  P: [phone]  F: [phone] [] [facility]  Outpatient Rehabilitation &  Therapy  [address]  [phone]  [phone] [x] [facility]  Outpatient Rehabilitation &  Therapy  [address]   P: [phone]  F: [phone]  [] [facility]  Outpatient Rehabilitation &  Therapy  [address]  P: [phone]  F: [phone] [] [facility]  Outpatient Rehabilitation &  Therapy  [address]  P: [phone]  F: [phone] [] [facility]  Outpatient Rehabilitation &  Therapy  [address]  P: [phone]  F: [phone] [] [facility]  Outpatient Rehabilitation &  Therapy  [address]  P: [phone]  F: [phone]  [] [facility] Outpatient Rehabilitation &  Therapy  [address]  P: [phone]  F: [phone]     Physical Therapy Daily Treatment Note    Date:  2025  Patient Name:  John Freeman    :  1995  MRN: [id number]  Physician: Dr. Juan J La               Insurance: Self pay retail   Medical Diagnosis: Z98.890 (ICD-10-CM) - History of repair of pectoralis muscle tear                   Rehab Codes: M62.81 , M25.512 , M25.612 , R29.3   Onset Date: 24                                         Next 's appt: Mid-February   Visit# / total visits:      Cancels/No Shows: 0    Subjective:    Pain:  [x] Yes  [] No Location: left shoulder    Pain Rating: (0-10 scale) not rated/10  Pain altered Tx:  [x] No  [] Yes

## 2025-02-10 ENCOUNTER — HOSPITAL ENCOUNTER (OUTPATIENT)
Dept: PHYSICAL THERAPY | Facility: CLINIC | Age: 30
Setting detail: THERAPIES SERIES
Discharge: HOME | End: 2025-02-10

## 2025-02-10 PROCEDURE — 9900000067 HC THERAPEUTIC EXERCISE EA 15 MINS (SELF-PAY)

## 2025-02-10 NOTE — FLOWSHEET NOTE
[] [facility]  Outpatient Rehabilitation &  Therapy  [address]  [phone]  [phone] [] [facility]  Outpatient Rehabilitation &  Therapy  [address]  P: [phone]  F: [phone] [] [facility]  Outpatient Rehabilitation &  Therapy  [address]  P: [phone]  F: [phone] [] [facility]  Outpatient Rehabilitation &  Therapy  [address]  [phone]  [phone] [x] [facility]  Outpatient Rehabilitation &  Therapy  [address]   P: [phone]  F: [phone]  [] [facility]  Outpatient Rehabilitation &  Therapy  [address]  P: [phone]  F: [phone] [] [facility]  Outpatient Rehabilitation &  Therapy  [address]  P: [phone]  F: [phone] [] [facility]  Outpatient Rehabilitation &  Therapy  [address]  P: [phone]  F: [phone] [] [facility]  Outpatient Rehabilitation &  Therapy  [address]  P: [phone]  F: [phone]  [] [facility] Outpatient Rehabilitation &  Therapy  [address]  P: [phone]  F: [phone]     Physical Therapy Daily Treatment Note    Date:  2/10/2025  Patient Name:  John Freeman    :  1995  MRN: [id number]  Physician: Dr. Juan J La               Insurance: Self pay retail   Medical Diagnosis: Z98.890 (ICD-10-CM) - History of repair of pectoralis muscle tear                   Rehab Codes: M62.81 , M25.512 , M25.612 , R29.3   Onset Date: 24                                         Next 's appt: Mid-February   Visit# / total visits: 10/16     Cancels/No Shows: 0    Subjective:    Pain:  [x] Yes  [] No Location: left shoulder    Pain Rating: (0-10 scale) not rated/10  Pain altered Tx:  [x] No  [] Yes

## 2025-02-12 ENCOUNTER — HOSPITAL ENCOUNTER (OUTPATIENT)
Dept: PHYSICAL THERAPY | Facility: CLINIC | Age: 30
Setting detail: THERAPIES SERIES
Discharge: HOME | End: 2025-02-12

## 2025-02-12 PROCEDURE — 9900000067 HC THERAPEUTIC EXERCISE EA 15 MINS (SELF-PAY)

## 2025-02-12 NOTE — FLOWSHEET NOTE
Slide with Resistance Band  - 1 x daily - 7 x weekly - 3 sets - 10 reps  - Half-Kneeling Bottoms Up Kettlebell Overhead Press  - 1 x daily - 7 x weekly - 3 sets - 10 reps  - Full Plank  - 1 x daily - 7 x weekly - 3 sets - 10 reps  - Plank Shoulder Protraction Tilts on BOSU®  - 1 x daily - 7 x weekly - 3 sets - 10 reps  - Supine Shoulder Alphabet  - 1 x daily - 7 x weekly - 3 sets - 10 reps  - Supine Scapular Protraction in Flexion with Dumbbells  - 1 x daily - 7 x weekly - 3 sets - 10 reps    Comprehension of Education:  [x] Verbalizes understanding.  [x] Demonstrates understanding.  [x] Needs review.  [] Demonstrates/verbalizes HEP/Ed previously given.       Plan: [x] Continue current frequency toward long and short term goals.    [x] Specific Instructions for subsequent treatments: see above         Time In: 7:55a              Time Out: 8:37a      Electronically signed by:  Bessie Wynn PT      
no

## 2025-02-21 ENCOUNTER — HOSPITAL ENCOUNTER (OUTPATIENT)
Dept: PHYSICAL THERAPY | Facility: CLINIC | Age: 30
Setting detail: THERAPIES SERIES
Discharge: HOME | End: 2025-02-21

## 2025-02-21 PROCEDURE — 9900000067 HC THERAPEUTIC EXERCISE EA 15 MINS (SELF-PAY)

## 2025-02-21 NOTE — FLOWSHEET NOTE
[] [facility]  Outpatient Rehabilitation &  Therapy  [address]  [phone]  [phone] [] [facility]  Outpatient Rehabilitation &  Therapy  [address]  P: [phone]  F: [phone] [] [facility]  Outpatient Rehabilitation &  Therapy  [address]  P: [phone]  F: [phone] [] [facility]  Outpatient Rehabilitation &  Therapy  [address]  [phone]  [phone] [x] [facility]  Outpatient Rehabilitation &  Therapy  [address]   P: [phone]  F: [phone]  [] [facility]  Outpatient Rehabilitation &  Therapy  [address]  P: [phone]  F: [phone] [] [facility]  Outpatient Rehabilitation &  Therapy  [address]  P: [phone]  F: [phone] [] [facility]  Outpatient Rehabilitation &  Therapy  [address]  P: [phone]  F: [phone] [] [facility]  Outpatient Rehabilitation &  Therapy  [address]  P: [phone]  F: [phone]  [] [facility] Outpatient Rehabilitation &  Therapy  [address]  P: [phone]  F: [phone]     Physical Therapy Daily Treatment Note    Date:  2025  Patient Name:  John Freeman    :  1995  MRN: [id number]  Physician: Dr. Juan J La               Insurance: Self pay retail   Medical Diagnosis: Z98.890 (ICD-10-CM) - History of repair of pectoralis muscle tear                   Rehab Codes: M62.81 , M25.512 , M25.612 , R29.3   Onset Date: 24                                         Next 's appt: not scheduled   Visit# / total visits:      Cancels/No Shows: 0    Subjective:    Pain:  [x] Yes  [] No Location: left shoulder    Pain Rating: (0-10 scale) not rated/10  Pain altered Tx:  [x] No  [] Yes

## 2025-02-26 ENCOUNTER — HOSPITAL ENCOUNTER (OUTPATIENT)
Dept: PHYSICAL THERAPY | Facility: CLINIC | Age: 30
Setting detail: THERAPIES SERIES
Discharge: HOME | End: 2025-02-26

## 2025-02-26 PROCEDURE — 9900000067 HC THERAPEUTIC EXERCISE EA 15 MINS (SELF-PAY)

## 2025-03-19 ENCOUNTER — HOSPITAL ENCOUNTER (OUTPATIENT)
Dept: PHYSICAL THERAPY | Facility: CLINIC | Age: 30
Setting detail: THERAPIES SERIES
Discharge: HOME | End: 2025-03-19

## 2025-04-22 NOTE — FLOWSHEET NOTE
It was a pleasure to see you today.  I would like to remind you about importance of a healthy lifestyle in order to improve your well-being and live longer. Try to engage in physical activities for at least 150 minutes per week.  Eat about 10 servings of fruits and vegetables daily. My advice is 2 servings of fruits and 8 servings of vegetables. For vegetables choose at least half of them green and at least half of them fresh.  Please avoid sugar, salt, fried food and saturated fat.  Weight loss is advised. Target BMI: below 25. Please follow low carbohydrate diet and daily exercise routine for at least 30 minutes. Nutritional consultation is available, please let me know if you are interested. I will be happy to discuss details with you if interested.   Have a good day and stay well.      Obesity is a chronic, relapsing, progressive, treatable, multifactorial, neurobehavioral disease, where in an increase in body fat promotes adipose (fat) tissue dysfunction, as well as biomechanical stress on the body which can result in adverse metabolic, biomechanical, and psychosocial health consequences, in addition to reducing quality of life and lifespan.   Without treatment, obesity is likely to progress and worsen, further increase the patient's risk for numerous health conditions caused by excess adiposity and increasing the risk for premature death.     - Counseling provided on impact of diet, physical activity, stress & sleep in treatment of obesity.    - Counseled on reduced calorie, high protein, high fiber, whole foods diet.  Counseling on benefits of avoidance of frequent intake of processed foods and liquid calories.   - Counseled on behavioral modification strategies and tools to support weight loss.   - Reviewed pathophysiology of obesity in context of relationship to nutrition, energy balance and environmental factors that influence nutrition and energy balance outcomes.  - Counseled on various behavioral    [] [facility]  Outpatient Rehabilitation &  Therapy  [address]  [phone]  [phone] [] [facility]  Outpatient Rehabilitation &  Therapy  [address]  P: [phone]  F: [phone] [] [facility]  Outpatient Rehabilitation &  Therapy  [address]  P: [phone]  F: [phone] [] [facility]  Outpatient Rehabilitation &  Therapy  [address]  [phone]  [phone] [x] [facility]  Outpatient Rehabilitation &  Therapy  [address]   P: [phone]  F: [phone]  [] [facility]  Outpatient Rehabilitation &  Therapy  [address]  P: [phone]  F: [phone] [] [facility]  Outpatient Rehabilitation &  Therapy  [address]  P: [phone]  F: [phone] [] [facility]  Outpatient Rehabilitation &  Therapy  [address]  P: [phone]  F: [phone] [] [facility]  Outpatient Rehabilitation &  Therapy  [address]  P: [phone]  F: [phone]  [] [facility] Outpatient Rehabilitation &  Therapy  [address]  P: [phone]  F: [phone]     Physical Therapy Daily Treatment Note    Date:  2025  Patient Name:  John Freeman    :  1995  MRN: [id number]  Physician: Dr. Juan J La               Insurance: Self pay retail   Medical Diagnosis: Z98.890 (ICD-10-CM) - History of repair of pectoralis muscle tear                   Rehab Codes: M62.81 , M25.512 , M25.612 , R29.3   Onset Date: 24                                         Next 's appt: Mid-February   Visit# / total visits:      Cancels/No Shows: 0    Subjective:    Pain:  [x] Yes  [] No Location: left shoulder  Pain Rating: (0-10 scale) not rated/10  Pain altered Tx:  [x] No  [] Yes   strategies and self monitoring practices to enhance understanding and individual assessment of energy balance, how various changes in types of foods consumed and macronutrient distribution can impact appetite regulation and be used as a tool in treatment of obesity.       The ability to age comfortably depends on how you invest in your body.   Include physical activity in your daily routine. Physical activity increases blood flow to your whole body, including your brain. ...  Eat a healthy diet. A heart-healthy diet may benefit your brain.   Stay mentally active. Be social.   Treat cardiovascular disease.  No smoking, excessive EtOH intake or illicit drug use.

## 2025-04-29 SDOH — ECONOMIC STABILITY: FOOD INSECURITY: WITHIN THE PAST 12 MONTHS, YOU WORRIED THAT YOUR FOOD WOULD RUN OUT BEFORE YOU GOT MONEY TO BUY MORE.: NEVER TRUE

## 2025-04-29 SDOH — ECONOMIC STABILITY: FOOD INSECURITY: WITHIN THE PAST 12 MONTHS, THE FOOD YOU BOUGHT JUST DIDN'T LAST AND YOU DIDN'T HAVE MONEY TO GET MORE.: NEVER TRUE

## 2025-04-29 SDOH — ECONOMIC STABILITY: INCOME INSECURITY: IN THE LAST 12 MONTHS, WAS THERE A TIME WHEN YOU WERE NOT ABLE TO PAY THE MORTGAGE OR RENT ON TIME?: NO

## 2025-04-29 SDOH — ECONOMIC STABILITY: TRANSPORTATION INSECURITY
IN THE PAST 12 MONTHS, HAS LACK OF TRANSPORTATION KEPT YOU FROM MEETINGS, WORK, OR FROM GETTING THINGS NEEDED FOR DAILY LIVING?: NO

## 2025-04-29 SDOH — ECONOMIC STABILITY: TRANSPORTATION INSECURITY
IN THE PAST 12 MONTHS, HAS THE LACK OF TRANSPORTATION KEPT YOU FROM MEDICAL APPOINTMENTS OR FROM GETTING MEDICATIONS?: NO

## 2025-04-29 NOTE — PATIENT INSTRUCTIONS
John    Thank you for choosing [facility].  We know you have options when it comes to your healthcare; we appreciate that you chose us. Our goal is to provide exceptional  service and world class care to every patient.  You will be receiving a survey via email or text message asking for your feedback.  Please take a few minutes to share your thoughts about your recent visit. Your comments help us understand what we do well and ways we can improve.  Thank you in advance for your valuable feedback.      Dr. CHARLY Williamson

## 2025-04-30 ENCOUNTER — OFFICE VISIT (OUTPATIENT)
Age: 30
End: 2025-04-30
Payer: COMMERCIAL

## 2025-04-30 VITALS
SYSTOLIC BLOOD PRESSURE: 110 MMHG | DIASTOLIC BLOOD PRESSURE: 86 MMHG | HEIGHT: 71 IN | HEART RATE: 79 BPM | OXYGEN SATURATION: 97 % | TEMPERATURE: 98.2 F | WEIGHT: 196.4 LBS | BODY MASS INDEX: 27.5 KG/M2

## 2025-04-30 DIAGNOSIS — R35.0 URINARY FREQUENCY: Primary | ICD-10-CM

## 2025-04-30 DIAGNOSIS — Z00.00 WELL ADULT EXAM: ICD-10-CM

## 2025-04-30 DIAGNOSIS — R82.998 FOAMY URINE: ICD-10-CM

## 2025-04-30 LAB
BILIRUBIN, POC: NEGATIVE
BLOOD URINE, POC: NEGATIVE
CLARITY, POC: CLEAR
COLOR, POC: YELLOW
GLUCOSE URINE, POC: NEGATIVE MG/DL
KETONES, POC: NEGATIVE MG/DL
LEUKOCYTE EST, POC: NEGATIVE
NITRITE, POC: NEGATIVE
PH, POC: 6
PROTEIN, POC: NEGATIVE MG/DL
SPECIFIC GRAVITY, POC: 1.02
UROBILINOGEN, POC: 0.2 MG/DL

## 2025-04-30 PROCEDURE — 99213 OFFICE O/P EST LOW 20 MIN: CPT | Performed by: FAMILY MEDICINE

## 2025-04-30 PROCEDURE — 81003 URINALYSIS AUTO W/O SCOPE: CPT | Performed by: FAMILY MEDICINE

## 2025-04-30 ASSESSMENT — PATIENT HEALTH QUESTIONNAIRE - PHQ9
SUM OF ALL RESPONSES TO PHQ QUESTIONS 1-9: 0
SUM OF ALL RESPONSES TO PHQ QUESTIONS 1-9: 0
2. FEELING DOWN, DEPRESSED OR HOPELESS: NOT AT ALL
SUM OF ALL RESPONSES TO PHQ QUESTIONS 1-9: 0
1. LITTLE INTEREST OR PLEASURE IN DOING THINGS: NOT AT ALL
SUM OF ALL RESPONSES TO PHQ QUESTIONS 1-9: 0

## 2025-04-30 ASSESSMENT — ENCOUNTER SYMPTOMS
SHORTNESS OF BREATH: 0
CHEST TIGHTNESS: 0

## 2025-04-30 NOTE — PROGRESS NOTES
[facility]  [facility] MEDICINE  [address]  [place] [facility] [address]  Dept: [phone]     Date of Visit:  2025  Patient Name: John Freeman   Patient :  1995     CHIEF COMPLAINT/HPI:     John Freeman is a 29 y.o. male who presents today for an general visit to be evaluated for the following condition(s):  Chief Complaint   Patient presents with    Urinary Frequency     He is here for smelly bubbly urine for the past 2 months.    No swelling.  No weight gain.  No other symtpoms.    REVIEW OF SYSTEM      Review of Systems   Respiratory:  Negative for chest tightness and shortness of breath.    Cardiovascular:  Negative for chest pain.         REVIEWED INFORMATION      No Known Allergies    Current Outpatient Medications   Medication Sig Dispense Refill    erythromycin (ROMYCIN) 5 MG/GM ophthalmic ointment Place into the left eye nightly       No current facility-administered medications for this visit.        Patient Active Problem List   Diagnosis    Hematospermia    Penile wart    Chronic prostatitis       No past medical history on file.    Past Surgical History:   Procedure Laterality Date    COLONOSCOPY          Social History     Socioeconomic History    Marital status: Single   Occupational History    Occupation:    Tobacco Use    Smoking status: Never    Smokeless tobacco: Never   Vaping Use    Vaping status: Never Used   Substance and Sexual Activity    Alcohol use: Yes     Comment: socially    Drug use: Never    Sexual activity: Yes     Partners: Female     Social Drivers of Health     Financial Resource Strain: Low Risk  (2024)    Overall Financial Resource Strain (CARDIA)     Difficulty of Paying Living Expenses: Not hard at all   Food Insecurity: No Food Insecurity (2025)    Hunger Vital Sign     Worried About Running Out of Food in the Last Year: Never true     Ran Out of Food in the Last Year: Never true

## 2025-05-13 NOTE — DISCHARGE SUMMARY
[] [facility]  Outpatient Rehabilitation &  Therapy  [address]  [phone]  [phone] [] [facility]  Outpatient Rehabilitation &  Therapy  [address]  P: [phone]  F: [phone] [] [facility]  Outpatient Rehabilitation &  Therapy  [address]  P: [phone]  F: [phone] [] [facility]  Outpatient Rehabilitation &  Therapy  [address]  [phone]  [phone] [x] [facility]  Outpatient Rehabilitation &  Therapy  [address]   P: [phone]  F: [phone]  [] [facility]  Outpatient Rehabilitation &  Therapy  [address]  P: [phone]  F: [phone] [] [facility]  Outpatient Rehabilitation &  Therapy  [address]  P: [phone]  F: [phone] [] [facility]  Outpatient Rehabilitation &  Therapy  [address]  P: [phone]  F: [phone] [] [facility]  Outpatient Rehabilitation &  Therapy  [address]  P: [phone]  F: [phone]  [] [facility] Outpatient Rehabilitation &  Therapy  [address]  P: [phone]  F: [phone]     Physical Therapy Discharge Note    Date: 2025      Patient: John Freeman  : 1995  MRN: [id number]    Physician: Dr. Juan J La               Insurance: Self pay retail   Medical Diagnosis: Z98.890 (ICD-10-CM) - History of repair of pectoralis muscle tear                   Rehab Codes: M62.81 , M25.512 , M25.612 , R29.3   Onset Date: 24                                         Next 's appt: not scheduled   Visit# / total visits:                     Cancels/No Shows: 0  Date of initial visit: 1/10/25                Date of final visit: 25      Subjective:  No complaints. Patient joined [employer].

## 2025-08-15 ENCOUNTER — OFFICE VISIT (OUTPATIENT)
Age: 30
End: 2025-08-15
Payer: COMMERCIAL

## 2025-08-15 VITALS
HEIGHT: 71 IN | WEIGHT: 193.6 LBS | OXYGEN SATURATION: 97 % | DIASTOLIC BLOOD PRESSURE: 84 MMHG | BODY MASS INDEX: 27.1 KG/M2 | HEART RATE: 79 BPM | TEMPERATURE: 98.7 F | SYSTOLIC BLOOD PRESSURE: 120 MMHG

## 2025-08-15 DIAGNOSIS — R53.83 OTHER FATIGUE: ICD-10-CM

## 2025-08-15 DIAGNOSIS — Z13.220 SCREENING, LIPID: ICD-10-CM

## 2025-08-15 DIAGNOSIS — R51.9 ACUTE NONINTRACTABLE HEADACHE, UNSPECIFIED HEADACHE TYPE: ICD-10-CM

## 2025-08-15 DIAGNOSIS — Z13.1 SCREENING FOR DIABETES MELLITUS: ICD-10-CM

## 2025-08-15 DIAGNOSIS — R07.89 CHEST TIGHTNESS: Primary | ICD-10-CM

## 2025-08-15 LAB — HBA1C MFR BLD: 5 %

## 2025-08-15 PROCEDURE — 93000 ELECTROCARDIOGRAM COMPLETE: CPT | Performed by: STUDENT IN AN ORGANIZED HEALTH CARE EDUCATION/TRAINING PROGRAM

## 2025-08-15 PROCEDURE — 99214 OFFICE O/P EST MOD 30 MIN: CPT | Performed by: STUDENT IN AN ORGANIZED HEALTH CARE EDUCATION/TRAINING PROGRAM

## 2025-08-15 PROCEDURE — 83036 HEMOGLOBIN GLYCOSYLATED A1C: CPT | Performed by: STUDENT IN AN ORGANIZED HEALTH CARE EDUCATION/TRAINING PROGRAM

## 2025-08-15 SDOH — ECONOMIC STABILITY: FOOD INSECURITY: WITHIN THE PAST 12 MONTHS, YOU WORRIED THAT YOUR FOOD WOULD RUN OUT BEFORE YOU GOT MONEY TO BUY MORE.: NEVER TRUE

## 2025-08-15 SDOH — ECONOMIC STABILITY: FOOD INSECURITY: WITHIN THE PAST 12 MONTHS, THE FOOD YOU BOUGHT JUST DIDN'T LAST AND YOU DIDN'T HAVE MONEY TO GET MORE.: NEVER TRUE

## 2025-08-15 ASSESSMENT — ENCOUNTER SYMPTOMS
SORE THROAT: 0
NAUSEA: 0
SHORTNESS OF BREATH: 0
VOMITING: 0
CONSTIPATION: 0
CHEST TIGHTNESS: 1
DIARRHEA: 0
RHINORRHEA: 0

## 2025-08-15 ASSESSMENT — PATIENT HEALTH QUESTIONNAIRE - PHQ9
SUM OF ALL RESPONSES TO PHQ QUESTIONS 1-9: 0
2. FEELING DOWN, DEPRESSED OR HOPELESS: NOT AT ALL
SUM OF ALL RESPONSES TO PHQ QUESTIONS 1-9: 0
SUM OF ALL RESPONSES TO PHQ QUESTIONS 1-9: 0
1. LITTLE INTEREST OR PLEASURE IN DOING THINGS: NOT AT ALL
SUM OF ALL RESPONSES TO PHQ QUESTIONS 1-9: 0

## 2025-08-19 ENCOUNTER — PATIENT MESSAGE (OUTPATIENT)
Age: 30
End: 2025-08-19

## 2025-08-19 DIAGNOSIS — E16.2 HYPOGLYCEMIA: Primary | ICD-10-CM
